# Patient Record
Sex: FEMALE | Race: WHITE
[De-identification: names, ages, dates, MRNs, and addresses within clinical notes are randomized per-mention and may not be internally consistent; named-entity substitution may affect disease eponyms.]

---

## 2024-01-19 ENCOUNTER — HOSPITAL ENCOUNTER (EMERGENCY)
Age: 59
LOS: 1 days | Discharge: ANOTHER HOSPITAL | End: 2024-01-20
Payer: MEDICARE

## 2024-01-19 VITALS — OXYGEN SATURATION: 98 % | RESPIRATION RATE: 37 BRPM | HEART RATE: 88 BPM

## 2024-01-19 VITALS — OXYGEN SATURATION: 97 % | HEART RATE: 106 BPM

## 2024-01-19 VITALS — DIASTOLIC BLOOD PRESSURE: 55 MMHG | HEART RATE: 87 BPM | SYSTOLIC BLOOD PRESSURE: 90 MMHG | OXYGEN SATURATION: 99 %

## 2024-01-19 VITALS — HEART RATE: 101 BPM | OXYGEN SATURATION: 99 %

## 2024-01-19 VITALS
HEART RATE: 90 BPM | DIASTOLIC BLOOD PRESSURE: 56 MMHG | SYSTOLIC BLOOD PRESSURE: 87 MMHG | OXYGEN SATURATION: 97 % | RESPIRATION RATE: 18 BRPM | TEMPERATURE: 98.5 F

## 2024-01-19 VITALS — HEART RATE: 95 BPM | OXYGEN SATURATION: 99 %

## 2024-01-19 VITALS — OXYGEN SATURATION: 98 % | HEART RATE: 96 BPM

## 2024-01-19 VITALS — SYSTOLIC BLOOD PRESSURE: 108 MMHG | HEART RATE: 100 BPM | DIASTOLIC BLOOD PRESSURE: 71 MMHG | OXYGEN SATURATION: 98 %

## 2024-01-19 VITALS — OXYGEN SATURATION: 98 % | HEART RATE: 89 BPM

## 2024-01-19 VITALS — OXYGEN SATURATION: 98 % | HEART RATE: 91 BPM

## 2024-01-19 VITALS — HEART RATE: 91 BPM | OXYGEN SATURATION: 98 % | SYSTOLIC BLOOD PRESSURE: 106 MMHG | DIASTOLIC BLOOD PRESSURE: 64 MMHG

## 2024-01-19 VITALS
RESPIRATION RATE: 20 BRPM | DIASTOLIC BLOOD PRESSURE: 56 MMHG | OXYGEN SATURATION: 97 % | SYSTOLIC BLOOD PRESSURE: 101 MMHG | HEART RATE: 87 BPM

## 2024-01-19 VITALS — DIASTOLIC BLOOD PRESSURE: 64 MMHG | SYSTOLIC BLOOD PRESSURE: 96 MMHG | OXYGEN SATURATION: 96 % | HEART RATE: 94 BPM

## 2024-01-19 VITALS — OXYGEN SATURATION: 99 % | HEART RATE: 95 BPM

## 2024-01-19 VITALS — HEART RATE: 90 BPM | RESPIRATION RATE: 23 BRPM | DIASTOLIC BLOOD PRESSURE: 75 MMHG | SYSTOLIC BLOOD PRESSURE: 97 MMHG

## 2024-01-19 VITALS — HEART RATE: 102 BPM | OXYGEN SATURATION: 97 %

## 2024-01-19 VITALS — OXYGEN SATURATION: 98 % | SYSTOLIC BLOOD PRESSURE: 110 MMHG | DIASTOLIC BLOOD PRESSURE: 68 MMHG | HEART RATE: 94 BPM

## 2024-01-19 VITALS — HEART RATE: 104 BPM | OXYGEN SATURATION: 99 %

## 2024-01-19 VITALS — OXYGEN SATURATION: 98 % | HEART RATE: 98 BPM

## 2024-01-19 VITALS — SYSTOLIC BLOOD PRESSURE: 102 MMHG | DIASTOLIC BLOOD PRESSURE: 78 MMHG | HEART RATE: 98 BPM | OXYGEN SATURATION: 98 %

## 2024-01-19 VITALS — OXYGEN SATURATION: 99 % | HEART RATE: 90 BPM | SYSTOLIC BLOOD PRESSURE: 94 MMHG | DIASTOLIC BLOOD PRESSURE: 69 MMHG

## 2024-01-19 VITALS — HEART RATE: 94 BPM | OXYGEN SATURATION: 97 %

## 2024-01-19 VITALS — OXYGEN SATURATION: 96 % | HEART RATE: 90 BPM

## 2024-01-19 VITALS — OXYGEN SATURATION: 98 % | SYSTOLIC BLOOD PRESSURE: 106 MMHG | DIASTOLIC BLOOD PRESSURE: 73 MMHG | HEART RATE: 94 BPM

## 2024-01-19 VITALS — HEART RATE: 95 BPM | OXYGEN SATURATION: 98 %

## 2024-01-19 VITALS — HEART RATE: 93 BPM | OXYGEN SATURATION: 99 % | RESPIRATION RATE: 10 BRPM

## 2024-01-19 VITALS — OXYGEN SATURATION: 99 % | HEART RATE: 94 BPM

## 2024-01-19 VITALS — BODY MASS INDEX: 43.5 KG/M2

## 2024-01-19 VITALS — HEART RATE: 88 BPM | OXYGEN SATURATION: 98 %

## 2024-01-19 DIAGNOSIS — T82.898A: Primary | ICD-10-CM

## 2024-01-19 DIAGNOSIS — Z79.01: ICD-10-CM

## 2024-01-19 DIAGNOSIS — Z79.890: ICD-10-CM

## 2024-01-19 DIAGNOSIS — Z79.899: ICD-10-CM

## 2024-01-19 LAB
ADD MANUAL DIFF: NO
ANION GAP: 14.4
BLOOD UREA NITROGEN: 57 MG/DL (ref 7–18)
CALCIUM: 9.9 MG/DL (ref 8.5–10.1)
CARBON DIOXIDE: 27.4 MMOL/L (ref 21–32)
CHLORIDE: 98 MMOL/L (ref 98–107)
CO2 BLD-SCNC: 27.4 MMOL/L (ref 21–32)
ESTIMATED GFR (AFRICAN AMERICA: 6 (ref 60–?)
ESTIMATED GFR (NON-AFRICAN AME: 5 (ref 60–?)
GLUCOSE BLD-MCNC: 206 MG/DL (ref 74–106)
HCT VFR BLD CALC: 43.6 % (ref 36–48)
HEMATOCRIT: 43.6 % (ref 36–48)
HEMOGLOBIN: 12.9 G/DL (ref 12–16)
IMMATURE GRANULOCYTES ABS AUTO: 0.04 10^3/UL (ref 0–0.03)
IMMATURE GRANULOCYTES PCT AUTO: 0.4 % (ref 0–0.5)
INR: <0.93
LYMPHOCYTES  ABSOLUTE AUTO: 1.1 10^3/UL (ref 1.2–3.8)
MCV RBC: 88.6 FL (ref 81–99)
MEAN CORPUSCULAR HEMOGLOBIN: 26.2 PG (ref 26.7–34)
MEAN CORPUSCULAR HGB CONC: 29.6 G/DL (ref 29.9–35.2)
MEAN CORPUSCULAR VOLUME: 88.6 FL (ref 81–99)
PARTIAL THROMBOPLASTIN TIME: 28.5 SEC (ref 22.3–36.2)
PLATELET # BLD: 155 10^3/UL (ref 150–450)
PLATELET COUNT: 155 10^3/UL (ref 150–450)
POTASSIUM SERPLBLD-SCNC: 4.8 MMOL/L (ref 3.5–5.1)
POTASSIUM: 4.8 MMOL/L (ref 3.5–5.1)
PROTHROMBIN TIME: 9.8 SEC (ref 9–11.6)
RED BLOOD COUNT: 4.92 10^6/UL (ref 4.2–5.4)
SODIUM BLD-SCNC: 135 MMOL/L (ref 136–145)
SODIUM: 135 MMOL/L (ref 136–145)
WBC # BLD: 10.2 10^3/UL (ref 4–11)
WHITE BLOOD COUNT: 10.2 10^3/UL (ref 4–11)

## 2024-01-19 PROCEDURE — 80048 BASIC METABOLIC PNL TOTAL CA: CPT

## 2024-01-19 PROCEDURE — 85610 PROTHROMBIN TIME: CPT

## 2024-01-19 PROCEDURE — 84484 ASSAY OF TROPONIN QUANT: CPT

## 2024-01-19 PROCEDURE — 36415 COLL VENOUS BLD VENIPUNCTURE: CPT

## 2024-01-19 PROCEDURE — 71045 X-RAY EXAM CHEST 1 VIEW: CPT

## 2024-01-19 PROCEDURE — 85730 THROMBOPLASTIN TIME PARTIAL: CPT

## 2024-01-19 PROCEDURE — 93005 ELECTROCARDIOGRAM TRACING: CPT

## 2024-01-19 PROCEDURE — 85025 COMPLETE CBC W/AUTO DIFF WBC: CPT

## 2024-01-19 PROCEDURE — 99285 EMERGENCY DEPT VISIT HI MDM: CPT

## 2024-01-19 NOTE — PC.NURSE
Patient states she was 1.5 hours into her dialysis treatment when she heard her alarm going off that her port was occluded. patient states she removed a 3 inch clot from the port and turned off her treatment. patient stated to have mild chest pain 
7/10 as she was removing the clot but has since resolved.

## 2024-01-19 NOTE — ED.CHESTPAI1
HPI - Chest Pain
General
Chief Complaint: Chest Pain
Stated Complaint: clot during dialysis, irreg heart rate
Time Seen by Provider: 01/19/24 19:09
Source: patient
Mode of arrival: ambulance
Limitations: no limitations
History of Present Illness
HPI narrative: 
58-year-old female presents for chest pain. She does home hemodialysis and she does about two and half hours five times a week. She takes two and half milligrams of Eliquis before each dialysis treatment which is five times a week. She was about an 
hour and a half into dialysis tonight and the blood wasn't returning and she since that something was wrong so she disconnected that hemodialysis machine from her port. She pulled out she states a 3 inch long clot from one of the ports.  she had 
some pain on the right side of her chest and the left side of her chest and she felt a little bit short of breath and she called the ambulance and she was brought in here. No fever or productive cough. She had no problems with her hemodialysis 
yesterday.
Related Data
Home Medications

 Medication  Instructions  Recorded  Confirmed
apixaban 2.5 mg tablet (Eliquis) 2.5 mg PO DAILY 01/19/24 01/19/24
atorvastatin 40 mg tablet 40 mg PO DAILY 01/19/24 01/19/24
calcium acetate(phosphat bind) 667 667 mg PO TID 01/19/24 01/19/24
mg capsule   
clopidogrel 75 mg tablet 75 mg PO DAILY 01/19/24 01/19/24
cyclobenzaprine 10 mg tablet 10 mg PO DAILY 01/19/24 01/19/24
fluoxetine 20 mg capsule 60 mg PO DAILY 01/19/24 01/19/24
hydrocodone 5 mg-acetaminophen 325 tab 01/19/24 
mg tablet   
levothyroxine 137 mcg tablet 137 mcg PO DAILY 01/19/24 01/19/24
levothyroxine 150 mcg tablet 150 mcg PO DAILY 01/19/24 01/19/24
lorazepam 1 mg tablet 1 mg PO DAILY 01/19/24 01/19/24
midodrine 10 mg tablet 10 mg PO TID 01/19/24 01/19/24
nystatin 100,000 unit/gram topical topical 01/19/24 
cream   
oxybutynin chloride 5 mg tablet 5 mg PO DAILY 01/19/24 01/19/24
pantoprazole 40 mg tablet,delayed 40 mg PO DAILY 01/19/24 01/19/24
release   


Allergies

Allergy/AdvReac Type Severity Reaction Status Date / Time
heparin AdvReac Intermediate  Verified 01/19/24 18:46
Sulfa (Sulfonamide AdvReac Intermediate  Verified 01/19/24 18:46
Antibiotics)     



Review of Systems
ROS  
 Narrative A ten point review of systems is negative except as noted above.   

PFSH
PFSH
Social History
Smoking status:  Never smoker 



Exam
Narrative
Exam Narrative: 
Nurses note and vital signs reviewed and patient is not hypoxic.

General:  The patient appears well and in no apparent distress.  Patient is resting comfortably on cart.
Skin:  Warm, dry, no pallor noted.  There is no rash noted.
Head:  Normocephalic, atraumatic
Eye: Normal conjunctiva, no drainage
Ears, Nose, Mouth, and Throat: oral mucosa is moist. Nares patent. 
Cardiovascular:  Regular Rate and Rhythm
Respiratory:  Patient is in no distress, no accessory muscle use, lungs are clear to auscultation, no wheezing, rales or rhonchi
Back:  non-tender
GI:  obese soft and nontender
Musculoskeletal: The patient has no evidence of calf tenderness, no pitting edema, symmetrical pulses noted bilaterally
Neurological:  A&O, normal speech
Psychiatric:  Cooperative
Constitutional
Vital Signs, click to edit/add: 

Last Vital Signs

Temp  98.5 F   01/19/24 18:46
Pulse  87   01/20/24 03:27
Resp  16   01/20/24 03:27
BP  107/79   01/20/24 03:18
Pulse Ox  96   01/20/24 03:27
O2 Del Method  Room Air  01/19/24 18:46




Course
Vital Signs
Vital signs: 

Vital Signs

Temperature  98.5 F   01/19/24 18:46
Pulse Rate  90   01/19/24 18:46
Respiratory Rate  18   01/19/24 18:46
Blood Pressure  87/56 L  01/19/24 18:46
Pulse Oximetry  97   01/19/24 18:46
Oxygen Delivery Method  Room Air  01/19/24 18:46



Temperature  98.5 F   01/19/24 18:46
Pulse Rate  87   01/20/24 03:27
Respiratory Rate  16   01/20/24 03:27
Blood Pressure  107/79   01/20/24 03:18
Pulse Oximetry  96   01/20/24 03:27
Oxygen Delivery Method  Room Air  01/19/24 18:46




MDM - Chest Pain
MDM Narrative
Medical decision making narrative: 
The patient has a clotted dialysis catheter. At this point I don't suspect pulmonary embolism. She doesn't seem to have any chest pain and she is already anticoagulated. The patient does home dialysis five times a week and will need new dialysis 
catheter or declotting of this one. I spoke to nurse practitioner at [facility] and the patient is accepted there. The patient refused to be transferred to [facility]. Her 1st choice was [facility] but I 
spoke to the admitting physician there and they do not have interventional radiology or vascular surgery available for this patient.
Differential Diagnosis
Differential diagnosis: Likely other (clotted dialysis catheter)
Lab Data
Attestation: I reviewed the patient's lab results.
Labs: 

Lab Results

  01/19/24 Range/Units
  19:34 
WBC  10.2  (4.0-11.0)  10^3/uL
RBC  4.92  (4.20-5.40)  10^6/uL
Hgb  12.9  (12.0-16.0)  g/dL
Hct  43.6  (36.0-48.0)  %
MCV  88.6  (81.0-99.0)  fL
MCH  26.2 L  (26.7-34.0)  pg
MCHC  29.6 L  (29.9-35.2)  g/dL
RDW  19.9 H  (11.0-15.0)  %
Plt Count  155  (150-450)  10^3/uL
MPV  10.4  (9.5-13.5)  fL
Neut % (Auto)  80.2 H  (43.0-75.0)  %
Lymph % (Auto)  10.9 L  (20.5-60.0)  %
Mono % (Auto)  7.2  (1.7-12.0)  %
Eos % (Auto)  0.8 L  (0.9-7.0)  %
Baso % (Auto)  0.5  (0.2-2.0)  %
Neut # (Auto)  8.2 H  (1.4-6.5)  10^3/uL
Lymph # (Auto)  1.1 L  (1.2-3.8)  10^3/uL
Mono # (Auto)  0.7  (0.3-0.8)  10^3/uL
Eos # (Auto)  0.1  (0.0-0.7)  10^3/uL
Baso # (Auto)  0.1  (0.0-0.1)  10^3/uL
Abs Immat Gran (auto)  0.04 H  (0.00-0.03)  10^3/uL
Imm/Tot Granulo (auto)  0.4  (0.0-0.5)  %
PT  9.8  (9.0-11.6)  sec
INR  <0.93  
APTT  28.5  (22.3-36.2)  sec
Sodium  135 L  (136-145)  mmol/L
Potassium  4.8  (3.5-5.1)  mmol/L
Chloride  98  ()  mmol/L
Carbon Dioxide  27.4  (21.0-32.0)  mmol/L
Anion Gap  14.4  
BUN  57.0 H  (7.0-18.0)  mg/dL
Creatinine  8.24 H*  (0.55-1.02)  mg/dL
Est GFR ( Amer)  6 L  (>=60)  
Est GFR (Non-Af Amer)  5 L  (>=60)  
BUN/Creatinine Ratio  6.9  
Glucose  206 H  ()  mg/dL
Calcium  9.9  (8.5-10.1)  mg/dL
Troponin I High Sens  23.6  (4.0-51.3)  pg/mL



Imaging Data
Chest x-ray: 
      Radiologist's impression: 

ITS Impressions

Chest X-Ray  01/19/24 19:21
IMPRESSION: 
 
No acute infiltrate or evidence of cardiac decompensation. The right-sided 
catheter is in place. Direct comparison with a previous study may be helpful 
in 
determining the chronicity of these findings.
 
 
Electronically authenticated by: MARY JANE PEREZ   Date: 1/19/2024  20:12




ECG Data
Attestation: I personally reviewed and interpreted this ECG as follows: (EKG on my interpretation shows sinus rhythm without acute changes)
Heart Score
History: Slightly/Non-Suspicious
ECG: Normal
Age: >45-<65 years
Risk Factors: 1 or 2 Risk Factors
Troponin: <Normal Limit
Total Heart Score Recommendations & Risks:: 2

Discharge Plan
Discharge
Chief Complaint: Chest Pain

Clinical Impression:
 Dialysis complication


Patient Disposition: [facility]

Time of Disposition Decision: 03:49

Discharge Location: [facility]

Condition: Good

Mode of Transportation: EMS

## 2024-01-19 NOTE — ECG_ITS
[facility] 
                                        
                                       Test Date:    2024 
Pat Name:     ROSALIO BRIGGS           Department:    
Patient ID:   [id number]               Room:         - 
Gender:       Female                   Technician:    
:          1965               Requested By: 1030 
Order Number: [id number]              Reading MD:   TEJ SUAZO 
                                 Measurements 
Intervals                              Axis           
Rate:         86                       P:            -38 
IL:           204                      QRS:          -60 
QRSD:         80                       T:            14 
QT:           340                                     
QTc:          384                                     
                           Interpretive Statements 
Sinus rhythm with first degree AV block 
2630 Left anterior fascicular block 
Remote inferolateral MI 
8102 Low QRS voltage in chest leads 
9150 **  abnormal ECG  ** 
No previous ECG available for comparison 
Electronically Signed On 2024 10:08:26 EST by TEJ SUAZO

## 2024-01-19 NOTE — PC.NURSE
Had lengthy discussion with pt about her plan of care, pt disagrees that she needs to go a certain facility, feels that she could go to [facility] now and have her port replaced. Both RN and Dr Villagomez explained to pt that we already reached out to 
[facility] and they are unable to accept pt at this time due to not having the appropriate physician there to do facility. Pt states that she understands, but then states that it shouldn't matter where she goes, she has gone 7 days without 
dialysis before.  Explained to pt that her options are either to be transferred to a facility that will accept her, or she would have to leave AMA. Pt states  I guess I don't have a choice . Pt requesting night time meds and a meal. Dr Villagomez aware 
and agreeable to both. Pt will be provided with a hospital bed for comfort and we are expecting a bed assignment Saturday morning after discharges, per . 's supervisor.

## 2024-01-19 NOTE — XR_ITS
The [address] [address] 
     [phone] 
  
  
Patient Name: 
ROSALIO BRIGGS 
  
MRN: [id number]    YOB: 1965    Sex: F 
Assigned Patient Location: ER 
Current Patient Location: ER 
Accession/Order Number: [id number] 
Exam Date: 1/19/2024  19:58    Report Date: 1/19/2024  20:12 
  
At the request of: 
HARRISON OROZCO   
  
Procedure:  XR chest 1V 
  
EXAM: XR chest 1V at 1952 hours 
  
HISTORY: CP 
  
COMPARISON: None.  
  
TECHNIQUE: AP upright portable chest x-ray 
  
FINDINGS: The heart is at the upper limits of normal in size. The vasculature  
is not distended. No acute infiltrate, effusion or pneumothorax is identified.  
  
A large bore central venous catheter is in place on the right with the tip in  
the right atrium. The osseous structures are grossly intact. 
  
ORDER #: [id number] XR/XR chest 1V  
IMPRESSION:   
   
No acute infiltrate or evidence of cardiac decompensation. The right-sided   
catheter is in place. Direct comparison with a previous study may be helpful   
in   
determining the chronicity of these findings.  
   
   
Electronically authenticated by: MARY JANE PEREZ   Date: 1/19/2024  20:12

## 2024-01-19 NOTE — ED_ITS
HPI - Chest Pain    
General    
Chief Complaint: Chest Pain    
Stated Complaint: clot during dialysis, irreg heart rate    
Time Seen by Provider: 01/19/24 19:09    
Source: patient    
Mode of arrival: ambulance    
Limitations: no limitations    
History of Present Illness    
HPI narrative:     
58-year-old female presents for chest pain. She does home hemodialysis and she   
does about two and half hours five times a week. She takes two and half mill  
igrams of Eliquis before each dialysis treatment which is five times a week. She  
was about an hour and a half into dialysis tonight and the blood wasn't   
returning and she since that something was wrong so she disconnected that   
hemodialysis machine from her port. She pulled out she states a 3 inch long clot  
from one of the ports.  she had some pain on the right side of her chest and the  
left side of her chest and she felt a little bit short of breath and she called   
the ambulance and she was brought in here. No fever or productive cough. She had  
no problems with her hemodialysis yesterday.    
Related Data    
                                Home Medications    
    
    
    
 Medication  Instructions  Recorded  Confirmed    
     
apixaban 2.5 mg tablet (Eliquis) 2.5 mg PO DAILY 01/19/24 01/19/24    
     
atorvastatin 40 mg tablet 40 mg PO DAILY 01/19/24 01/19/24    
     
calcium acetate(phosphat bind) 667 667 mg PO TID 01/19/24 01/19/24    
    
mg capsule       
     
clopidogrel 75 mg tablet 75 mg PO DAILY 01/19/24 01/19/24    
     
cyclobenzaprine 10 mg tablet 10 mg PO DAILY 01/19/24 01/19/24    
     
fluoxetine 20 mg capsule 60 mg PO DAILY 01/19/24 01/19/24    
     
hydrocodone 5 mg-acetaminophen 325 tab 01/19/24     
    
mg tablet       
     
levothyroxine 137 mcg tablet 137 mcg PO DAILY 01/19/24 01/19/24    
     
levothyroxine 150 mcg tablet 150 mcg PO DAILY 01/19/24 01/19/24    
     
lorazepam 1 mg tablet 1 mg PO DAILY 01/19/24 01/19/24    
     
midodrine 10 mg tablet 10 mg PO TID 01/19/24 01/19/24    
     
nystatin 100,000 unit/gram topical topical 01/19/24     
    
cream       
     
oxybutynin chloride 5 mg tablet 5 mg PO DAILY 01/19/24 01/19/24    
     
pantoprazole 40 mg tablet,delayed 40 mg PO DAILY 01/19/24 01/19/24    
    
release       
    
    
    
                                    Allergies    
    
    
    
Allergy/AdvReac Type Severity Reaction Status Date / Time    
     
heparin AdvReac Intermediate  Verified 01/19/24 18:46    
     
Sulfa (Sulfonamide AdvReac Intermediate  Verified 01/19/24 18:46    
    
Antibiotics)         
    
    
    
    
Review of Systems    
    
    
ROS      
    
 Narrative A ten point review of systems is negative except as noted above.       
    
    
PFSH    
PFSH    
Social History    
Smoking status:  Never smoker     
    
    
    
Exam    
Narrative    
Exam Narrative:     
Nurses note and vital signs reviewed and patient is not hypoxic.    
    
General:  The patient appears well and in no apparent distress.  Patient is   
resting comfortably on cart.    
Skin:  Warm, dry, no pallor noted.  There is no rash noted.    
Head:  Normocephalic, atraumatic    
Eye: Normal conjunctiva, no drainage    
Ears, Nose, Mouth, and Throat: oral mucosa is moist. Nares patent.     
Cardiovascular:  Regular Rate and Rhythm    
Respiratory:  Patient is in no distress, no accessory muscle use, lungs are   
clear to auscultation, no wheezing, rales or rhonchi    
Back:  non-tender    
GI:  obese soft and nontender    
Musculoskeletal: The patient has no evidence of calf tenderness, no pitting   
edema, symmetrical pulses noted bilaterally    
Neurological:  A&O, normal speech    
Psychiatric:  Cooperative    
Constitutional    
Vital Signs, click to edit/add:     
    
                                Last Vital Signs    
    
    
    
Temp  98.5 F   01/19/24 18:46    
     
Pulse  87   01/20/24 03:27    
     
Resp  16   01/20/24 03:27    
     
BP  107/79   01/20/24 03:18    
     
Pulse Ox  96   01/20/24 03:27    
     
O2 Del Method  Room Air  01/19/24 18:46    
    
    
    
    
    
Course    
Vital Signs    
Vital signs:     
    
                                   Vital Signs    
    
    
    
Temperature  98.5 F   01/19/24 18:46    
     
Pulse Rate  90   01/19/24 18:46    
     
Respiratory Rate  18   01/19/24 18:46    
     
Blood Pressure  87/56 L  01/19/24 18:46    
     
Pulse Oximetry  97   01/19/24 18:46    
     
Oxygen Delivery Method  Room Air  01/19/24 18:46    
    
    
                                            
    
    
    
Temperature  98.5 F   01/19/24 18:46    
     
Pulse Rate  87   01/20/24 03:27    
     
Respiratory Rate  16   01/20/24 03:27    
     
Blood Pressure  107/79   01/20/24 03:18    
     
Pulse Oximetry  96   01/20/24 03:27    
     
Oxygen Delivery Method  Room Air  01/19/24 18:46    
    
    
    
    
    
MDM - Chest Pain    
MDM Narrative    
Medical decision making narrative:     
The patient has a clotted dialysis catheter. At this point I don't suspect   
pulmonary embolism. She doesn't seem to have any chest pain and she is already   
anticoagulated. The patient does home dialysis five times a week and will need   
new dialysis catheter or declotting of this one. I spoke to nurse practitioner   
at [facility] and the patient is accepted there. The patient refused   
to be transferred to [facility] ([place], [place]). Her 1st choice   
was [facility] but I spoke to the admitting physician there and they do not   
have interventional radiology or vascular surgery available for this patient.    
Differential Diagnosis    
Differential diagnosis: Likely other (clotted dialysis catheter)    
Lab Data    
Attestation: I reviewed the patient's lab results.    
Labs:     
    
                                   Lab Results    
    
    
    
  01/19/24 Range/Units    
    
  19:34     
     
WBC  10.2  (4.0-11.0)  10^3/uL    
     
RBC  4.92  (4.20-5.40)  10^6/uL    
     
Hgb  12.9  (12.0-16.0)  g/dL    
     
Hct  43.6  (36.0-48.0)  %    
     
MCV  88.6  (81.0-99.0)  fL    
     
MCH  26.2 L  (26.7-34.0)  pg    
     
MCHC  29.6 L  (29.9-35.2)  g/dL    
     
RDW  19.9 H  (11.0-15.0)  %    
     
Plt Count  155  (150-450)  10^3/uL    
     
MPV  10.4  (9.5-13.5)  fL    
     
Neut % (Auto)  80.2 H  (43.0-75.0)  %    
     
Lymph % (Auto)  10.9 L  (20.5-60.0)  %    
     
Mono % (Auto)  7.2  (1.7-12.0)  %    
     
Eos % (Auto)  0.8 L  (0.9-7.0)  %    
     
Baso % (Auto)  0.5  (0.2-2.0)  %    
     
Neut # (Auto)  8.2 H  (1.4-6.5)  10^3/uL    
     
Lymph # (Auto)  1.1 L  (1.2-3.8)  10^3/uL    
     
Mono # (Auto)  0.7  (0.3-0.8)  10^3/uL    
     
Eos # (Auto)  0.1  (0.0-0.7)  10^3/uL    
     
Baso # (Auto)  0.1  (0.0-0.1)  10^3/uL    
     
Abs Immat Gran (auto)  0.04 H  (0.00-0.03)  10^3/uL    
     
Imm/Tot Granulo (auto)  0.4  (0.0-0.5)  %    
     
PT  9.8  (9.0-11.6)  sec    
     
INR  <0.93      
     
APTT  28.5  (22.3-36.2)  sec    
     
Sodium  135 L  (136-145)  mmol/L    
     
Potassium  4.8  (3.5-5.1)  mmol/L    
     
Chloride  98  ()  mmol/L    
     
Carbon Dioxide  27.4  (21.0-32.0)  mmol/L    
     
Anion Gap  14.4      
     
BUN  57.0 H  (7.0-18.0)  mg/dL    
     
Creatinine  8.24 H*  (0.55-1.02)  mg/dL    
     
Est GFR ( Amer)  6 L  (>=60)      
     
Est GFR (Non-Af Amer)  5 L  (>=60)      
     
BUN/Creatinine Ratio  6.9      
     
Glucose  206 H  ()  mg/dL    
     
Calcium  9.9  (8.5-10.1)  mg/dL    
     
Troponin I High Sens  23.6  (4.0-51.3)  pg/mL    
    
    
    
    
Imaging Data    
Chest x-ray:     
      Radiologist's impression:     
    
ITS Impressions    
    
Chest X-Ray  01/19/24 19:21    
IMPRESSION:     
     
No acute infiltrate or evidence of cardiac decompensation. The right-sided     
catheter is in place. Direct comparison with a previous study may be helpful     
in     
determining the chronicity of these findings.    
     
     
Electronically authenticated by: MARY JANE PEREZ   Date: 1/19/2024  20:12    
    
    
    
    
ECG Data    
Attestation: I personally reviewed and interpreted this ECG as follows: (EKG on   
my interpretation shows sinus rhythm without acute changes)    
Heart Score    
History: Slightly/Non-Suspicious    
ECG: Normal    
Age: >45-<65 years    
Risk Factors: 1 or 2 Risk Factors    
Troponin: <Normal Limit    
Total Heart Score Recommendations & Risks:: 2    
    
Discharge Plan    
Discharge    
Chief Complaint: Chest Pain    
    
Clinical Impression:    
 Dialysis complication    
    
    
Patient Disposition: [facility]    
    
Time of Disposition Decision: 03:49    
    
Discharge Location: [facility]    
    
Condition: Good    
    
Mode of Transportation: EMS

## 2024-01-20 ENCOUNTER — HOSPITAL ENCOUNTER (EMERGENCY)
Age: 59
Discharge: HOME | End: 2024-01-20
Attending: EMERGENCY MEDICINE
Payer: MEDICARE

## 2024-01-20 ENCOUNTER — DIRECT ADMIT ORDERS (OUTPATIENT)
Dept: INTERNAL MEDICINE CLINIC | Age: 59
End: 2024-01-20

## 2024-01-20 VITALS — OXYGEN SATURATION: 90 % | DIASTOLIC BLOOD PRESSURE: 75 MMHG | SYSTOLIC BLOOD PRESSURE: 112 MMHG

## 2024-01-20 VITALS — OXYGEN SATURATION: 96 %

## 2024-01-20 VITALS — OXYGEN SATURATION: 96 % | SYSTOLIC BLOOD PRESSURE: 117 MMHG | DIASTOLIC BLOOD PRESSURE: 67 MMHG

## 2024-01-20 VITALS — HEART RATE: 87 BPM | OXYGEN SATURATION: 96 % | RESPIRATION RATE: 16 BRPM

## 2024-01-20 VITALS
TEMPERATURE: 98 F | SYSTOLIC BLOOD PRESSURE: 135 MMHG | OXYGEN SATURATION: 96 % | HEART RATE: 95 BPM | RESPIRATION RATE: 19 BRPM | DIASTOLIC BLOOD PRESSURE: 32 MMHG

## 2024-01-20 VITALS — SYSTOLIC BLOOD PRESSURE: 112 MMHG | OXYGEN SATURATION: 95 % | DIASTOLIC BLOOD PRESSURE: 66 MMHG

## 2024-01-20 VITALS — OXYGEN SATURATION: 95 %

## 2024-01-20 VITALS — DIASTOLIC BLOOD PRESSURE: 68 MMHG | SYSTOLIC BLOOD PRESSURE: 92 MMHG

## 2024-01-20 VITALS — OXYGEN SATURATION: 94 %

## 2024-01-20 VITALS — DIASTOLIC BLOOD PRESSURE: 72 MMHG | SYSTOLIC BLOOD PRESSURE: 107 MMHG

## 2024-01-20 VITALS — OXYGEN SATURATION: 97 %

## 2024-01-20 VITALS — SYSTOLIC BLOOD PRESSURE: 91 MMHG | DIASTOLIC BLOOD PRESSURE: 64 MMHG

## 2024-01-20 VITALS — SYSTOLIC BLOOD PRESSURE: 107 MMHG | DIASTOLIC BLOOD PRESSURE: 79 MMHG | OXYGEN SATURATION: 96 %

## 2024-01-20 VITALS — SYSTOLIC BLOOD PRESSURE: 126 MMHG | DIASTOLIC BLOOD PRESSURE: 70 MMHG | OXYGEN SATURATION: 94 %

## 2024-01-20 VITALS — DIASTOLIC BLOOD PRESSURE: 80 MMHG | SYSTOLIC BLOOD PRESSURE: 104 MMHG | OXYGEN SATURATION: 92 %

## 2024-01-20 VITALS — SYSTOLIC BLOOD PRESSURE: 114 MMHG | DIASTOLIC BLOOD PRESSURE: 73 MMHG | OXYGEN SATURATION: 93 %

## 2024-01-20 VITALS — OXYGEN SATURATION: 92 %

## 2024-01-20 VITALS — SYSTOLIC BLOOD PRESSURE: 92 MMHG | DIASTOLIC BLOOD PRESSURE: 68 MMHG

## 2024-01-20 DIAGNOSIS — Z78.9 PROBLEM WITH VASCULAR ACCESS: Primary | ICD-10-CM

## 2024-01-20 LAB
ANION GAP SERPL CALCULATED.3IONS-SCNC: 18 MMOL/L (ref 9–17)
BASOPHILS # BLD: 0.09 K/UL (ref 0–0.2)
BASOPHILS NFR BLD: 1 % (ref 0–2)
BUN SERPL-MCNC: 65 MG/DL (ref 8–23)
CALCIUM SERPL-MCNC: 9.9 MG/DL (ref 8.6–10.4)
CHLORIDE SERPL-SCNC: 93 MMOL/L (ref 98–107)
CO2 SERPL-SCNC: 23 MMOL/L (ref 20–31)
CREAT SERPL-MCNC: 9.2 MG/DL (ref 0.5–0.9)
EOSINOPHIL # BLD: 0.37 K/UL (ref 0–0.4)
EOSINOPHILS RELATIVE PERCENT: 4 % (ref 1–4)
ERYTHROCYTE [DISTWIDTH] IN BLOOD BY AUTOMATED COUNT: 20.3 % (ref 11.8–14.4)
GFR SERPL CREATININE-BSD FRML MDRD: 4 ML/MIN/1.73M2
GLUCOSE SERPL-MCNC: 141 MG/DL (ref 70–99)
HCT VFR BLD AUTO: 47.5 % (ref 36.3–47.1)
HGB BLD-MCNC: 13.8 G/DL (ref 11.9–15.1)
IMM GRANULOCYTES # BLD AUTO: 0 K/UL (ref 0–0.3)
IMM GRANULOCYTES NFR BLD: 0 %
INR PPP: 1
LYMPHOCYTES NFR BLD: 1.29 K/UL (ref 1–4.8)
LYMPHOCYTES RELATIVE PERCENT: 14 % (ref 24–44)
MCH RBC QN AUTO: 26.1 PG (ref 25.2–33.5)
MCHC RBC AUTO-ENTMCNC: 29.1 G/DL (ref 28.4–34.8)
MCV RBC AUTO: 89.8 FL (ref 82.6–102.9)
MONOCYTES NFR BLD: 0.83 K/UL (ref 0.1–0.8)
MONOCYTES NFR BLD: 9 % (ref 1–7)
MORPHOLOGY: ABNORMAL
NEUTROPHILS NFR BLD: 72 % (ref 36–66)
NEUTS SEG NFR BLD: 6.62 K/UL (ref 1.8–7.7)
NRBC BLD-RTO: 0 PER 100 WBC
PARTIAL THROMBOPLASTIN TIME: 43.4 SEC (ref 23–36.5)
PLATELET # BLD AUTO: 168 K/UL (ref 138–453)
PMV BLD AUTO: 10.2 FL (ref 8.1–13.5)
POTASSIUM SERPL-SCNC: 4.9 MMOL/L (ref 3.7–5.3)
PROTHROMBIN TIME: 12.8 SEC (ref 11.7–14.9)
RBC # BLD AUTO: 5.29 M/UL (ref 3.95–5.11)
SODIUM SERPL-SCNC: 134 MMOL/L (ref 135–144)
WBC OTHER # BLD: 9.2 K/UL (ref 3.5–11.3)

## 2024-01-20 PROCEDURE — 6360000002 HC RX W HCPCS

## 2024-01-20 PROCEDURE — 85610 PROTHROMBIN TIME: CPT

## 2024-01-20 PROCEDURE — 80048 BASIC METABOLIC PNL TOTAL CA: CPT

## 2024-01-20 PROCEDURE — 85025 COMPLETE CBC W/AUTO DIFF WBC: CPT

## 2024-01-20 PROCEDURE — 99283 EMERGENCY DEPT VISIT LOW MDM: CPT

## 2024-01-20 PROCEDURE — 85730 THROMBOPLASTIN TIME PARTIAL: CPT

## 2024-01-20 RX ORDER — ACETAMINOPHEN 650 MG/1
650 SUPPOSITORY RECTAL EVERY 6 HOURS PRN
OUTPATIENT
Start: 2024-01-20

## 2024-01-20 RX ORDER — ONDANSETRON 4 MG/1
4 TABLET, ORALLY DISINTEGRATING ORAL EVERY 8 HOURS PRN
OUTPATIENT
Start: 2024-01-20

## 2024-01-20 RX ORDER — SODIUM CHLORIDE 0.9 % (FLUSH) 0.9 %
10 SYRINGE (ML) INJECTION PRN
OUTPATIENT
Start: 2024-01-20

## 2024-01-20 RX ORDER — POLYETHYLENE GLYCOL 3350 17 G/17G
17 POWDER, FOR SOLUTION ORAL DAILY PRN
OUTPATIENT
Start: 2024-01-20

## 2024-01-20 RX ORDER — ONDANSETRON 2 MG/ML
4 INJECTION INTRAMUSCULAR; INTRAVENOUS EVERY 6 HOURS PRN
OUTPATIENT
Start: 2024-01-20

## 2024-01-20 RX ORDER — SODIUM CHLORIDE 9 MG/ML
INJECTION, SOLUTION INTRAVENOUS PRN
OUTPATIENT
Start: 2024-01-20

## 2024-01-20 RX ORDER — HEPARIN SODIUM 5000 [USP'U]/ML
5000 INJECTION, SOLUTION INTRAVENOUS; SUBCUTANEOUS EVERY 8 HOURS SCHEDULED
OUTPATIENT
Start: 2024-01-20

## 2024-01-20 RX ORDER — ACETAMINOPHEN 325 MG/1
650 TABLET ORAL EVERY 6 HOURS PRN
OUTPATIENT
Start: 2024-01-20

## 2024-01-20 RX ORDER — SODIUM CHLORIDE 0.9 % (FLUSH) 0.9 %
5-40 SYRINGE (ML) INJECTION EVERY 12 HOURS SCHEDULED
OUTPATIENT
Start: 2024-01-20

## 2024-01-20 RX ADMIN — ALTEPLASE 1 MG: 2.2 INJECTION, POWDER, LYOPHILIZED, FOR SOLUTION INTRAVENOUS at 15:08

## 2024-01-20 ASSESSMENT — ENCOUNTER SYMPTOMS
VOMITING: 0
STRIDOR: 0
WHEEZING: 0
ABDOMINAL PAIN: 0
NAUSEA: 0
SHORTNESS OF BREATH: 0
CHEST TIGHTNESS: 0

## 2024-01-20 NOTE — DISCHARGE INSTRUCTIONS
You are seen in the emergency department for your catheter problem.  We used Cathflo and was not able to drain it.  Please when you go home use your argatroban to flush it again.    PLEASE RETURN TO THE EMERGENCY DEPARTMENT IMMEDIATELY for worsening symptoms of increasing pain, shortness of breath, feeling of your heart fluttering or racing, swelling to your feet, unable to lay flat, or if you develop any concerning symptoms such as: high fever not relieved by acetaminophen (Tylenol) and/or ibuprofen (Motrin / Advil), chills, persistent nausea and/or vomiting, loss of consciousness, numbness, weakness or tingling in the arms or legs or change in color of the extremities, changes in mental status, persistent headache, blurry vision, loss of bladder / bowel control, unable to follow up with your physician, or other any other care or concern.

## 2024-01-20 NOTE — ED PROVIDER NOTES
[facility] ED  eMERGENCY dEPARTMENT eNCOUnter   Attending Attestation     Pt Name: Ami Kruger  MRN: [id number]  Birthdate 1965  Date of evaluation: 1/20/24       Ami Kruger is a 58 y.o. female who presents with Vascular Access Problem (Pt states catheter is clotting)      1:08 PM EST      History: Pt presents with dialysis catheter issues. Pt has clots and bubbles coming from the catheter. Last partial dialysis was yesterday. Pt has a home machine and gets dialysis at home. Has allergy to heparin.     Exam: HRRR, lungs CTABL, abdomen soft and non tender. Pt awake and alert.     Plan for vascular surgery consult and admission. Pt was initially a direct admit but came ED to ED.     I performed a history and physical examination of the patient and discussed management with the resident. I reviewed the resident’s note and agree with the documented findings and plan of care. Any areas of disagreement are noted on the chart. I was personally present for the key portions of any procedures. I have documented in the chart those procedures where I was not present during the key portions. I have personally reviewed all images and agree with the resident's interpretation. I have reviewed the emergency nurses triage note. I agree with the chief complaint, past medical history, past surgical history, allergies, medications, social and family history as documented unless otherwise noted below. Documentation of the HPI, Physical Exam and Medical Decision Making performed by medical students or scribes is based on my personal performance of the HPI, PE and MDM. For Phys Assistant/ Nurse Practitioner cases/documentation I have had a face to face evaluation of this patient and have completed at least one if not all key elements of the E/M (history, physical exam, and MDM). Additional findings are as noted.    For APC cases I have personally evaluated and examined the patient in conjunction with the APC and  agree with the treatment plan and disposition of the patient as recorded by the APC.    Howie Meng MD  Attending Emergency  Physician       Howie Meng MD  01/20/24 2891

## 2024-01-20 NOTE — CONSULTS
Division of Vascular Surgery        New Consult      Physician Requesting Consult:  Dr. Meng    Reason for Consult:   Tunneled dialysis catheter dysfunction    Chief Complaint:     Clotted tunneled dialysis catheter    History of Present Illness:      Ami Kruger is a 58 y.o. woman with history of ESRD on dialysis who presents with dysfunction of her right subclavian tunneled dialysis catheter.  Patient states that she has been on dialysis for approximately 6 years now.  Patient has multiple failed AV fistulas and grafts.  Patient states that she has been dialyzing through a tunneled catheter for over a year now.  Patient states that she gets his catheters replaced intermittently.  Her most recent catheter was placed approximately a month ago.  Patient states that she dialyzes at home 5 days a week, and yesterday while undergoing dialysis, the venous port of her catheter clotted.  Patient states that she aspirated a large clot out of the catheter, but was unable to resume dialysis.  Patient states that her regular vascular surgeon is at [facility].  Vascular surgery was consulted for further evaluation and treatment.  Patient denies any other related symptoms.  Labs drawn in the emergency department are unremarkable catheter was flushed with Cathflo at bedside, and afterward was easily able to draw and flush.  Patient tolerated well with no immediate complications.  Patient is happy with catheter function and is comfortable discharging home.    Medical History:     History reviewed. No pertinent past medical history.    Surgical History:     History reviewed. No pertinent surgical history.    Family History:     History reviewed. No pertinent family history.    Allergies:       Heparin and Sulfa antibiotics    Medications:      No current facility-administered medications for this encounter.     No current outpatient medications on file.       Social History:     Tobacco:    reports that she has never  smoked. She does not have any smokeless tobacco history on file.  Alcohol:      reports that she does not currently use alcohol.  Drug Use:  reports that she does not currently use drugs.  Occupation: Retired    Review of Systems:     Review of Systems   Constitutional:  Negative for chills and fever.   HENT: Negative.     Respiratory:  Negative for chest tightness, shortness of breath, wheezing and stridor.    Cardiovascular:  Negative for chest pain.   Gastrointestinal:  Negative for abdominal pain, nausea and vomiting.   Genitourinary: Negative.    Musculoskeletal: Negative.    Neurological: Negative.    Hematological: Negative.        Physical Exam:     Vitals:  /72   Pulse 87   Temp 98 °F (36.7 °C) (Oral)   Resp 15   SpO2 98%     Physical Exam  Vitals and nursing note reviewed.   Constitutional:       General: She is not in acute distress.     Appearance: Normal appearance.   HENT:      Head: Normocephalic and atraumatic.      Nose: Nose normal.      Mouth/Throat:      Mouth: Mucous membranes are moist.      Pharynx: Oropharynx is clear.   Eyes:      Extraocular Movements: Extraocular movements intact.   Cardiovascular:      Rate and Rhythm: Normal rate and regular rhythm.      Pulses: Normal pulses.      Comments: Scars consistent with multiple AV fistulas and grafts in the bilateral upper extremities.  None of which have thrill.  Distal extremities appear well-perfused.  Pulses are intact.  Tunneled dialysis catheter present in the right chest.  Venous port with visible clot, initially unable to flush or draw off venous port.  After administration of Cathflo, venous port is easily able to flush and draw.   Pulmonary:      Effort: Pulmonary effort is normal. No respiratory distress.      Breath sounds: No stridor. No wheezing.   Abdominal:      General: There is no distension.      Palpations: Abdomen is soft.      Tenderness: There is no abdominal tenderness.   Musculoskeletal:      Cervical back:

## 2024-01-20 NOTE — ED NOTES
Pt to ED via EMS as a transfer from [place] for a dialysis port issue. Pt states she was doing dialysis yesterday at home when she was [facility] through and the port began to clot. Pt states she was having chest pain until she pulled out a clot that was 3 inches. Pt does dialysis at home 5 days a week, and no longer makes urine. Pt has hx of stroke which has made one side of her body weaker but she is unable to state which side. Pt denies any symptoms at this time, no other needs stated, call light within reach.

## 2024-01-20 NOTE — ED NOTES
ED to inpatient nurses report      Chief Complaint:  Chief Complaint   Patient presents with    Vascular Access Problem     Pt states catheter is clotting     Present to ED from: [place]     MOA:     LOC: alert and orientated to name, place, date  Mobility: Requires assistance * 1  Oxygen Baseline: 98%    Current needs required: room air   Pending ED orders: lab results   Present condition: stable     Why did the patient come to the ED? clot in dialysis port   What is the plan? Possible surgery, vascular surgery consult   Any procedures or intervention occur? Labs, EKG  Any safety concerns?? no    Mental Status:  Level of Consciousness: Alert (0)    Psych Assessment:   Psychosocial  Psychosocial (WDL): Within Defined Limits  Vital signs   Vitals:    01/20/24 1138   BP: 115/72   Pulse: 87   Resp: 15   Temp: 98 °F (36.7 °C)   TempSrc: Oral   SpO2: 98%        Vitals:  Patient Vitals for the past 24 hrs:   BP Temp Temp src Pulse Resp SpO2   01/20/24 1138 115/72 98 °F (36.7 °C) Oral 87 15 98 %      Visit Vitals  /72   Pulse 87   Temp 98 °F (36.7 °C) (Oral)   Resp 15   SpO2 98%        LDAs:      Ambulatory Status:  Presents to emergency department  because of falls (Syncope, seizure, or loss of consciousness): No, Age > 70: No, Altered Mental Status, Intoxication with alcohol or substance confusion (Disorientation, impaired judgment, poor safety awaremess, or inability to follow instructions): No, Impaired Mobility: Ambulates or transfers with assistive devices or assistance; Unable to ambulate or transer.: Yes, Nursing Judgement: Yes    Diagnosis:  DISPOSITION Decision To Admit 01/20/2024 12:10:46 PM   Final diagnoses:   Problem with vascular access        Consults:  IP CONSULT TO VASCULAR SURGERY     Treatment Team:   Treatment Team: Attending Provider: Howie Meng MD; Resident: Cristofer Dennis DO; Registered Nurse: Maye Navarrete RN    Treatment:          Skin Assessment:        Pain Score:

## 2024-01-20 NOTE — ED PROVIDER NOTES
[facility] ED  Emergency Department Encounter  Emergency Medicine Resident     Pt Name:Ami Kruger  MRN: [id number]  Birthdate 1965  Date of evaluation: 1/20/24  PCP:  No primary care provider on file.  Note Started: 11:42 AM EST      CHIEF COMPLAINT       Chief Complaint   Patient presents with    Vascular Access Problem     Pt states catheter is clotting       HISTORY OF PRESENT ILLNESS  (Location/Symptom, Timing/Onset, Context/Setting, Quality, Duration, Modifying Factors, Severity.)      Ami Kruger is a 58 y.o. female who presents with dialysis catheter problem.  Patient states yesterday she was doing her home dialysis got an hour and a half into her treatment when it stopped working.  She pulled off a couple inches of a clot and then was not not able to draw anything back.  Concern that there was air in the tube.  She went to [facility] where they attempted to transfer her to [facility] in [facility] where her vascular surgeon is.  There is no excepting physicians there so she was transferred here to [facility].  She states that she does not have any pain at this time.  She denies any lower extremity edema or feeling like she is short of breath.    PAST MEDICAL / SURGICAL / SOCIAL / FAMILY HISTORY      has no past medical history on file.       has no past surgical history on file.      Social History     Socioeconomic History    Marital status:      Spouse name: Not on file    Number of children: Not on file    Years of education: Not on file    Highest education level: Not on file   Occupational History    Not on file   Tobacco Use    Smoking status: Never    Smokeless tobacco: Not on file   Substance and Sexual Activity    Alcohol use: Not Currently    Drug use: Not Currently    Sexual activity: Not on file   Other Topics Concern    Not on file   Social History Narrative    Not on file     Social Determinants of Health     Financial Resource  Strain: Not on file   Food Insecurity: Not on file   Transportation Needs: Not on file   Physical Activity: Not on file   Stress: Not on file   Social Connections: Not on file   Intimate Partner Violence: Not on file   Housing Stability: Not on file       History reviewed. No pertinent family history.    Allergies:  Heparin and Sulfa antibiotics    Home Medications:  Prior to Admission medications    Not on File         REVIEW OF SYSTEMS       Review of Systems   Respiratory:  Negative for shortness of breath.    Cardiovascular:  Negative for chest pain and leg swelling.   Gastrointestinal:  Negative for abdominal pain, nausea and vomiting.       PHYSICAL EXAM      INITIAL VITALS:   BP (!) 135/32   Pulse 95   Temp 98 °F (36.7 °C) (Oral)   Resp 19   SpO2 96%     Physical Exam  Vitals and nursing note reviewed.   Constitutional:       Appearance: She is not ill-appearing or diaphoretic.   HENT:      Head: Normocephalic and atraumatic.   Eyes:      Conjunctiva/sclera: Conjunctivae normal.   Cardiovascular:      Rate and Rhythm: Normal rate and regular rhythm.      Pulses: Normal pulses.      Heart sounds: Normal heart sounds. No murmur heard.     No friction rub. No gallop.   Pulmonary:      Effort: Pulmonary effort is normal.      Breath sounds: Normal breath sounds. No wheezing, rhonchi or rales.   Chest:      Comments: Dialysis catheter right side of chest  Abdominal:      General: Bowel sounds are normal.      Palpations: Abdomen is soft.      Tenderness: There is no abdominal tenderness. There is no guarding or rebound.   Musculoskeletal:         General: No swelling, tenderness, deformity or signs of injury. Normal range of motion.      Cervical back: Normal range of motion and neck supple. No tenderness.   Skin:     General: Skin is warm and dry.   Neurological:      General: No focal deficit present.      Mental Status: She is alert and oriented to person, place, and time.   Psychiatric:         Mood and